# Patient Record
Sex: MALE | Race: WHITE | NOT HISPANIC OR LATINO | Employment: UNEMPLOYED | URBAN - METROPOLITAN AREA
[De-identification: names, ages, dates, MRNs, and addresses within clinical notes are randomized per-mention and may not be internally consistent; named-entity substitution may affect disease eponyms.]

---

## 2021-08-14 ENCOUNTER — HOSPITAL ENCOUNTER (EMERGENCY)
Facility: HOSPITAL | Age: 21
Discharge: HOME | End: 2021-08-14
Attending: EMERGENCY MEDICINE | Admitting: EMERGENCY MEDICINE

## 2021-08-14 ENCOUNTER — APPOINTMENT (OUTPATIENT)
Dept: CT IMAGING | Facility: HOSPITAL | Age: 21
End: 2021-08-14

## 2021-08-14 VITALS
WEIGHT: 180 LBS | OXYGEN SATURATION: 98 % | BODY MASS INDEX: 27.28 KG/M2 | DIASTOLIC BLOOD PRESSURE: 67 MMHG | HEART RATE: 71 BPM | TEMPERATURE: 98 F | HEIGHT: 68 IN | RESPIRATION RATE: 18 BRPM | SYSTOLIC BLOOD PRESSURE: 137 MMHG

## 2021-08-14 DIAGNOSIS — S39.92XA LOWER BACK INJURY, INITIAL ENCOUNTER: ICD-10-CM

## 2021-08-14 DIAGNOSIS — M51.26 LUMBAR DISC HERNIATION: Primary | ICD-10-CM
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
## 2021-08-14 LAB
BILIRUB UR QL STRIP: NEGATIVE
CLARITY UR: CLEAR
COLOR UR: YELLOW
GLUCOSE UR STRIP-MCNC: NEGATIVE MG/DL
HGB UR QL STRIP.AUTO: NEGATIVE
KETONES UR QL STRIP: ABNORMAL
LEUKOCYTE ESTERASE UR QL STRIP.AUTO: NEGATIVE
NITRITE UR QL STRIP: NEGATIVE
PH UR STRIP.AUTO: 5.5 [PH] (ref 5–8)
PROT UR QL STRIP: NEGATIVE
SP GR UR STRIP: 1.02 (ref 1–1.03)
UROBILINOGEN UR QL STRIP: ABNORMAL

## 2021-08-14 PROCEDURE — 81003 URINALYSIS AUTO W/O SCOPE: CPT | Performed by: EMERGENCY MEDICINE

## 2021-08-14 PROCEDURE — 63710000001 PREDNISONE PER 1 MG: Performed by: EMERGENCY MEDICINE

## 2021-08-14 PROCEDURE — 96372 THER/PROPH/DIAG INJ SC/IM: CPT

## 2021-08-14 PROCEDURE — 25010000002 KETOROLAC TROMETHAMINE PER 15 MG: Performed by: EMERGENCY MEDICINE

## 2021-08-14 PROCEDURE — 99283 EMERGENCY DEPT VISIT LOW MDM: CPT

## 2021-08-14 PROCEDURE — 72131 CT LUMBAR SPINE W/O DYE: CPT

## 2021-08-14 RX ORDER — ACETAMINOPHEN 500 MG
1000 TABLET ORAL ONCE
Status: COMPLETED | OUTPATIENT
Start: 2021-08-14 | End: 2021-08-14

## 2021-08-14 RX ORDER — PREDNISONE 20 MG/1
60 TABLET ORAL ONCE
Status: COMPLETED | OUTPATIENT
Start: 2021-08-14 | End: 2021-08-14

## 2021-08-14 RX ORDER — PREDNISONE 10 MG/1
TABLET ORAL
Qty: 35 TABLET | Refills: 0 | Status: SHIPPED | OUTPATIENT
Start: 2021-08-14 | End: 2021-08-14 | Stop reason: SDUPTHER

## 2021-08-14 RX ORDER — PREDNISONE 10 MG/1
TABLET ORAL
Qty: 35 TABLET | Refills: 0 | Status: SHIPPED | OUTPATIENT
Start: 2021-08-14

## 2021-08-14 RX ORDER — NAPROXEN 500 MG/1
500 TABLET ORAL 2 TIMES DAILY WITH MEALS
Qty: 14 TABLET | Refills: 0 | Status: SHIPPED | OUTPATIENT
Start: 2021-08-14

## 2021-08-14 RX ORDER — KETOROLAC TROMETHAMINE 30 MG/ML
30 INJECTION, SOLUTION INTRAMUSCULAR; INTRAVENOUS ONCE
Status: COMPLETED | OUTPATIENT
Start: 2021-08-14 | End: 2021-08-14

## 2021-08-14 RX ORDER — ACETAMINOPHEN 500 MG
1000 TABLET ORAL EVERY 6 HOURS PRN
Qty: 30 TABLET | Refills: 0 | Status: SHIPPED | OUTPATIENT
Start: 2021-08-14

## 2021-08-14 RX ADMIN — PREDNISONE 60 MG: 20 TABLET ORAL at 18:59

## 2021-08-14 RX ADMIN — ACETAMINOPHEN 1000 MG: 500 TABLET, FILM COATED ORAL at 18:59

## 2021-08-14 RX ADMIN — KETOROLAC TROMETHAMINE 30 MG: 30 INJECTION, SOLUTION INTRAMUSCULAR; INTRAVENOUS at 18:59

## 2021-08-14 NOTE — ED PROVIDER NOTES
Subjective   The patient presents to the emergency department after a fall 4 days ago with low back pain and urinary symptoms. Patient reports that he fell from a porch approximately 15 feet 4 days ago. He landed on his back. Patient has had lower back pain. He saw a chiropractor who evaluated the patient. He states the last 2 nights he has had urinary incontinence overnight. He has had no numbness or tingling. No fecal incontinence. No other acute symptoms or complaints. He describes the pain is moderate to severe. The pain is worse with movement.      History provided by:  Patient      Review of Systems   Constitutional: Negative.    Respiratory: Negative.    Cardiovascular: Negative.    Gastrointestinal: Negative.    Musculoskeletal: Positive for back pain.   Skin: Negative.    Psychiatric/Behavioral: Negative.    All other systems reviewed and are negative.      History reviewed. No pertinent past medical history.    No Known Allergies    History reviewed. No pertinent surgical history.    History reviewed. No pertinent family history.    Social History     Socioeconomic History   • Marital status: Single     Spouse name: Not on file   • Number of children: Not on file   • Years of education: Not on file   • Highest education level: Not on file   Vaping Use   • Vaping Use: Every day   • Substances: Nicotine, THC, Flavoring   • Devices: Disposable   Substance and Sexual Activity   • Alcohol use: Never   • Drug use: Yes     Types: Marijuana           Objective   Physical Exam  Vitals and nursing note reviewed.   Constitutional:       General: He is not in acute distress.     Appearance: Normal appearance. He is normal weight. He is not ill-appearing or toxic-appearing.   Cardiovascular:      Rate and Rhythm: Normal rate and regular rhythm.      Pulses: Normal pulses.   Pulmonary:      Effort: Pulmonary effort is normal. No respiratory distress.      Breath sounds: Normal breath sounds.   Abdominal:       Palpations: Abdomen is soft.      Tenderness: There is no abdominal tenderness. There is no guarding.   Musculoskeletal:         General: Normal range of motion.   Skin:     General: Skin is warm and dry.      Capillary Refill: Capillary refill takes less than 2 seconds.   Neurological:      General: No focal deficit present.      Mental Status: He is alert and oriented to person, place, and time.      Comments: Lower lumbar tenderness palpation. No saddle paresthesia. Reflexes are intact. No clonus. Strength is intact with good flexion and extension at the feet. No sensory deficit.   Psychiatric:         Mood and Affect: Mood normal.         Behavior: Behavior normal.         Procedures           ED Course  ED Course as of Aug 15 0043   Sat Aug 14, 2021   1836 Patient has no saddle paresthesias or symptoms other than the incontinence which she is experienced at night. He is not having any of these symptoms during the day. Patient had a CT scan that shows no fractures though there is L4/L5 disc disturbance. Unfortunately, the patient is not eligible for an MRI at this time secondary to a law enforcement associated ankle bracelet on the left. I talked with the patient's mom at his request who is an RN and explained the situation. She advised that she does not feel that he needs an MRI tonight and they will follow up next week with the appropriate authorities to get that corrected in order to get the MRI performed. We will check a post void residual and if that is normal, we will plan discharge with symptomatic management follow-up.    [RS]   1855 Patient's postvoid residual is 0.    [RS]   1855 We will plan to discharge the patient home to follow-up as an outpatient with neurosurgery for consideration of MRI as indicated. I had a discussion with the patient/family regarding diagnosis, diagnostic results, treatment plan, and medications.  The patient/family indicated understanding of these instructions.  I spent  adequate time at the bedside proceeding discharge necessary to personally discuss the aftercare instructions, giving patient education, providing explanations of the results of our evaluations/findings, and my decision making to assure that the patient/family understand the plan of care.  Time was allotted to answer questions at that time and throughout the ED course.  Emphasis was placed on timely follow-up after discharge.  I also discussed the potential for the development of an acute emergent condition requiring further evaluation, admission, or even surgical intervention. I discussed that we found nothing during the visit today indicating the need for further workup, admission, or the presence of an unstable medical condition.  I encouraged the patient to return to the emergency department immediately for ANY concerns, worsening, new complaints, or if symptoms persist and unable to seek follow-up in a timely fashion.  The patient/family expressed understanding and agreement with this plan.     [RS]      ED Course User Index  [RS] Dimitry Becker MD                                           MDM  Number of Diagnoses or Management Options  Fall, initial encounter  Lower back injury, initial encounter  Lumbar disc herniation  Diagnosis management comments: Recent Results (from the past 24 hour(s))  -Urinalysis With Microscopic If Indicated (No Culture) - Urine, Clean Catch  Collection Time: 08/14/21  6:54 PM  Specimen: Urine, Clean Catch       Result                      Value             Ref Range           Color, UA                   Yellow            Yellow, Straw       Appearance, UA              Clear             Clear               pH, UA                      5.5               5.0 - 8.0           Specific [place], UA        1.021             1.001 - 1.030       Glucose, UA                 Negative          Negative            Ketones, UA                                   Negative        80 mg/dL (3+)  "(A)       Bilirubin, UA               Negative          Negative            Blood, UA                   Negative          Negative            Protein, UA                 Negative          Negative            Leuk Esterase, UA           Negative          Negative            Nitrite, UA                 Negative          Negative            Urobilinogen, UA            0.2 E.U./dL       0.2 - 1.0 E.*  Note: In addition to lab results from this visit, the labs listed above may include labs taken at another facility or during a different encounter within the last 24 hours. Please correlate lab times with ED admission and discharge times for further clarification of the services performed during this visit.    CT Lumbar Spine Without Contrast   Preliminary Result    No acute fracture or critical spinal canal stenosis by    osseous means. Limited soft tissue windows demonstrate a left    lateralizing broad disc bulge at the L4-L5 level which may have at least    mild to moderate spinal canal stenosis         DICTATED:   08/14/2021    EDITED/ls :   08/14/2021          -------------------------------------               08/14/21 08/14/21                  1649         1830     -------------------------------------   BP:          139/86       137/67     BP Location:    Left arm                 Patient Position:    Sitting                  Pulse:         71                    Resp:          18                    Temp:   98 °F (36.7 °C)              SpO2:         99%          98%       Weight: 81.6 kg (180 lb)             Height:  172.7 cm (68\")             -------------------------------------  Medications  acetaminophen (TYLENOL) tablet 1,000 mg (1,000 mg Oral Given 8/14/21 1859)  ketorolac (TORADOL) injection 30 mg (30 mg Intramuscular Given 8/14/21 1859)   predniSONE (DELTASONE) tablet 60 mg (60 mg Oral Given 8/14/21 1859)  ECG/EMG Results (last 24 hours)     ** No results " found for the last 24 hours. **      No orders to display         Amount and/or Complexity of Data Reviewed  Tests in the radiology section of CPT®: reviewed  Independent visualization of images, tracings, or specimens: yes        Final diagnoses:   Lumbar disc herniation   Fall, initial encounter   Lower back injury, initial encounter       ED Disposition  ED Disposition     ED Disposition Condition Comment    Discharge Stable           [facility] Emergency Department  [address] [address]  [phone]    As needed, If symptoms worsen or ANY concerns.    Kendall Page MD  [address]  [place] [address]  [phone]    Schedule an appointment as soon as possible for a visit            Medication List      New Prescriptions    acetaminophen 500 MG tablet  Commonly known as: TYLENOL  Take 2 tablets by mouth Every 6 (Six) Hours As Needed for Mild Pain  or Moderate Pain .     naproxen 500 MG tablet  Commonly known as: NAPROSYN  Take 1 tablet by mouth 2 (Two) Times a Day With Meals.     predniSONE 10 MG tablet  Commonly known as: DELTASONE  40mg PO daily for 5 days, then 20mg PO daily for 5 days, then 10mg PO daily for 5 days           Where to Get Your Medications      These medications were sent to [facility]/pharmacy #[id number] - [place], [place] - [address] - [phone]  - [phone]   [address] [place] [address]    Hours: 24-hours Phone: [phone]   · acetaminophen 500 MG tablet  · naproxen 500 MG tablet  · predniSONE 10 MG tablet          Dimitry Becker MD  08/15/21 0043